# Patient Record
Sex: FEMALE | Race: WHITE | Employment: OTHER | URBAN - METROPOLITAN AREA
[De-identification: names, ages, dates, MRNs, and addresses within clinical notes are randomized per-mention and may not be internally consistent; named-entity substitution may affect disease eponyms.]

---

## 2017-01-01 ENCOUNTER — TELEPHONE (OUTPATIENT)
Dept: NEUROLOGY | Facility: CLINIC | Age: 82
End: 2017-01-01

## 2017-01-01 ENCOUNTER — NURSE ONLY (OUTPATIENT)
Dept: ELECTROPHYSIOLOGY | Facility: HOSPITAL | Age: 82
End: 2017-01-01
Attending: Other
Payer: MEDICARE

## 2017-01-01 ENCOUNTER — APPOINTMENT (OUTPATIENT)
Dept: LAB | Facility: HOSPITAL | Age: 82
End: 2017-01-01
Attending: Other
Payer: MEDICARE

## 2017-01-01 ENCOUNTER — HOSPITAL ENCOUNTER (OUTPATIENT)
Dept: CT IMAGING | Facility: HOSPITAL | Age: 82
Discharge: HOME | End: 2017-01-01
Attending: Other
Payer: MEDICARE

## 2017-01-01 DIAGNOSIS — G30.1 LATE ONSET ALZHEIMER'S DISEASE WITHOUT BEHAVIORAL DISTURBANCE (HCC): ICD-10-CM

## 2017-01-01 DIAGNOSIS — F02.80 LATE ONSET ALZHEIMER'S DISEASE WITHOUT BEHAVIORAL DISTURBANCE (HCC): ICD-10-CM

## 2017-01-01 DIAGNOSIS — R41.0 CONFUSION: ICD-10-CM

## 2017-01-01 DIAGNOSIS — N18.30 CRF (CHRONIC RENAL FAILURE), STAGE 3 (MODERATE) (HCC): ICD-10-CM

## 2017-01-01 DIAGNOSIS — I10 HTN (HYPERTENSION), BENIGN: ICD-10-CM

## 2017-01-01 PROCEDURE — 36415 COLL VENOUS BLD VENIPUNCTURE: CPT

## 2017-01-01 PROCEDURE — 82746 ASSAY OF FOLIC ACID SERUM: CPT

## 2017-01-01 PROCEDURE — 95819 EEG AWAKE AND ASLEEP: CPT | Performed by: OTHER

## 2017-01-01 PROCEDURE — 84443 ASSAY THYROID STIM HORMONE: CPT

## 2017-01-01 PROCEDURE — 70450 CT HEAD/BRAIN W/O DYE: CPT | Performed by: OTHER

## 2017-01-01 PROCEDURE — 82607 VITAMIN B-12: CPT

## 2017-01-01 PROCEDURE — 87086 URINE CULTURE/COLONY COUNT: CPT | Performed by: INTERNAL MEDICINE

## 2017-01-24 ENCOUNTER — PRIOR ORIGINAL RECORDS (OUTPATIENT)
Dept: OTHER | Age: 82
End: 2017-01-24

## 2017-02-08 ENCOUNTER — PRIOR ORIGINAL RECORDS (OUTPATIENT)
Dept: OTHER | Age: 82
End: 2017-02-08

## 2017-02-08 LAB
ALBUMIN: 3.7 G/DL
ALKALINE PHOSPHATATE(ALK PHOS): 68 IU/L
BILIRUBIN TOTAL: 0.68 MG/DL
BUN: 30 MG/DL
CALCIUM: 8.9 MG/DL
CHLORIDE: 104 MEQ/L
CHOLESTEROL, TOTAL: 213 MG/DL
CREATININE, SERUM: 1.35 MG/DL
GLUCOSE: 101 MG/DL
HDL CHOLESTEROL: 93 MG/DL
HEMATOCRIT: 37.1 %
HEMOGLOBIN: 11.8 G/DL
LDL CHOLESTEROL: 105 MG/DL
PLATELETS: 207 K/UL
POTASSIUM, SERUM: 5 MEQ/L
PROTEIN, TOTAL: 7.2 G/DL
RED BLOOD COUNT: 4.1 X 10-6/U
SGOT (AST): 18 IU/L
SGPT (ALT): 20 IU/L
SODIUM: 144 MEQ/L
TRIGLYCERIDES: 74 MG/DL
WHITE BLOOD COUNT: 6.88 X 10-3/U

## 2017-04-03 ENCOUNTER — PRIOR ORIGINAL RECORDS (OUTPATIENT)
Dept: OTHER | Age: 82
End: 2017-04-03

## 2017-07-29 ENCOUNTER — PRIOR ORIGINAL RECORDS (OUTPATIENT)
Dept: OTHER | Age: 82
End: 2017-07-29

## 2017-08-01 ENCOUNTER — PRIOR ORIGINAL RECORDS (OUTPATIENT)
Dept: OTHER | Age: 82
End: 2017-08-01

## 2017-08-15 PROBLEM — Z85.3 HISTORY OF BREAST CANCER: Status: ACTIVE | Noted: 2017-08-15

## 2017-09-11 ENCOUNTER — PRIOR ORIGINAL RECORDS (OUTPATIENT)
Dept: OTHER | Age: 82
End: 2017-09-11

## 2017-09-19 LAB
BUN: 37 MG/DL
CALCIUM: 9.1 MG/DL
CHLORIDE: 102 MEQ/L
CREATININE, SERUM: 1.6 MG/DL
GLUCOSE: 94 MG/DL
HEMATOCRIT: 35.5 %
HEMOGLOBIN: 11.6 G/DL
PLATELETS: 199 K/UL
POTASSIUM, SERUM: 4.8 MEQ/L
RED BLOOD COUNT: 3.91 X 10-6/U
SODIUM: 140 MEQ/L
WHITE BLOOD COUNT: 8.05 X 10-3/U

## 2017-10-04 ENCOUNTER — PRIOR ORIGINAL RECORDS (OUTPATIENT)
Dept: OTHER | Age: 82
End: 2017-10-04

## 2017-10-24 ENCOUNTER — PRIOR ORIGINAL RECORDS (OUTPATIENT)
Dept: OTHER | Age: 82
End: 2017-10-24

## 2017-11-01 ENCOUNTER — PRIOR ORIGINAL RECORDS (OUTPATIENT)
Dept: OTHER | Age: 82
End: 2017-11-01

## 2017-11-02 ENCOUNTER — PRIOR ORIGINAL RECORDS (OUTPATIENT)
Dept: OTHER | Age: 82
End: 2017-11-02

## 2017-11-13 ENCOUNTER — TELEPHONE (OUTPATIENT)
Dept: NEUROLOGY | Facility: CLINIC | Age: 82
End: 2017-11-13

## 2017-11-13 NOTE — TELEPHONE ENCOUNTER
Pts PCP Dr. Pop Parson calling the office to see if pt can be seen by Dr. Chilo Rivers earlier than already scheduled upcoming OV on 12/7/17. She states that patient was recently in ED for increased confusion.   Testing ruled out UTI, so Dr. Pop Parson is suspecting pt i

## 2017-11-20 ENCOUNTER — OFFICE VISIT (OUTPATIENT)
Dept: NEUROLOGY | Facility: CLINIC | Age: 82
End: 2017-11-20

## 2017-11-20 VITALS
SYSTOLIC BLOOD PRESSURE: 138 MMHG | HEART RATE: 64 BPM | BODY MASS INDEX: 28 KG/M2 | RESPIRATION RATE: 16 BRPM | WEIGHT: 152 LBS | DIASTOLIC BLOOD PRESSURE: 74 MMHG

## 2017-11-20 DIAGNOSIS — G30.1 LATE ONSET ALZHEIMER'S DISEASE WITHOUT BEHAVIORAL DISTURBANCE (HCC): ICD-10-CM

## 2017-11-20 DIAGNOSIS — F02.80 LATE ONSET ALZHEIMER'S DISEASE WITHOUT BEHAVIORAL DISTURBANCE (HCC): ICD-10-CM

## 2017-11-20 DIAGNOSIS — I10 ESSENTIAL HYPERTENSION: Primary | ICD-10-CM

## 2017-11-20 DIAGNOSIS — R41.0 INTERMITTENT CONFUSION: ICD-10-CM

## 2017-11-20 DIAGNOSIS — R41.0 CONFUSION: ICD-10-CM

## 2017-11-20 PROCEDURE — 99215 OFFICE O/P EST HI 40 MIN: CPT | Performed by: OTHER

## 2017-11-20 RX ORDER — DONEPEZIL HYDROCHLORIDE 10 MG/1
10 TABLET, FILM COATED ORAL NIGHTLY
Qty: 30 TABLET | Refills: 11 | Status: SHIPPED | OUTPATIENT
Start: 2017-11-20 | End: 2017-11-20

## 2017-11-20 RX ORDER — DONEPEZIL HYDROCHLORIDE 10 MG/1
10 TABLET, FILM COATED ORAL NIGHTLY
Qty: 30 TABLET | Refills: 11 | Status: SHIPPED | OUTPATIENT
Start: 2017-11-20

## 2017-11-20 RX ORDER — GUAIFENESIN 100 MG/5ML
200 SYRUP ORAL EVERY 6 HOURS PRN
COMMUNITY
End: 2018-01-01 | Stop reason: ALTCHOICE

## 2017-11-20 RX ORDER — NITROFURANTOIN 25; 75 MG/1; MG/1
50 CAPSULE ORAL DAILY
COMMUNITY
End: 2018-01-01 | Stop reason: ALTCHOICE

## 2017-11-20 NOTE — PATIENT INSTRUCTIONS
Refill policies:    • Allow 2-3 business days for refills; controlled substances may take longer.   • Contact your pharmacy at least 5 days prior to running out of medication and have them send an electronic request or submit request through the [facility] have a procedure or additional testing performed. [facility]) will contact your insurance carrier to obtain pre-certification or prior authorization.     Unfortunately, [facility] has seen an increase in denial of payment even though the p

## 2017-11-20 NOTE — PROGRESS NOTES
Nichol [address]   Neurology; follow up  CLINIC VISIT  2017    [place] Patient Status:  No patient class for patient encounter    1927 MRN [id number]   Location [address] [place] [address] guaiFENesin 100 MG/5ML Oral Syrup Take 200 mg by mouth every 6 (six) hours as needed for cough. Disp:  Rfl:    Donepezil HCl 10 MG Oral Tab Take 1 tablet (10 mg total) by mouth nightly.  Disp: 30 tablet Rfl: 11   furosemide 20 MG Oral Tab Take 1 tablet (2 denies nausea, vomiting, constipation, diarrhea; no rectal bleeding; no heartburn  GENITAL/: no dysuria, urgency or frequency;  no hernias; no nocturia  GYNE/: no dysuria, urgency or frequency; no urinary incontinence  MUSCULOSKELETAL: no joint complai symmetric. 5/5 in all limbs with normal tone. No tremor of any kind; there is limited motion in her L. Shoulder due to recent surgery,   Sensory; Intact to light touch, temperature, vibration   DTRs;   Symmetric in both arms and legs, including biceps, tri [address]   11/20/2017

## 2017-11-21 NOTE — TELEPHONE ENCOUNTER
Lynnette Stapleton, nurse at [employer], calling to confirm the increase in Aricept to 10 mg at . Relayed doctors recommendations based on office visit notes. Verbalized understanding. Office visit notes faxed to number provided. Confirmation received.     Per D

## 2017-11-27 NOTE — TELEPHONE ENCOUNTER
Left message for pts daughter Ankush Kaba to see if she still required a conversation with Dr. Fred Aldrich. Encouraged her to c/b office tomorrow, as Dr. Fred Aldrich is out of office today, if she still needs to speak with her.

## 2017-12-01 NOTE — TELEPHONE ENCOUNTER
----- Message from Nick Maria MD sent at 11/29/2017  5:09 PM CST -----  CT head , nothing  Significant, will review at visit.  Labs normal.

## 2017-12-01 NOTE — TELEPHONE ENCOUNTER
Spoke with Ankush Kaba, patient's daughter and POA and relayed results below. She verbalized understanding.

## 2017-12-01 NOTE — TELEPHONE ENCOUNTER
# listed in [id number] [address] that can leave message is for Sho Shelton who is not listed on HIPPA consent as someone we are able to give info to.  TCB daughter Ella Fierro who is listed on HIPPA consent

## 2017-12-07 NOTE — PROCEDURES
[facility] [address]  [facility] [address] [address]      PATIENT'S NAME: Marlin Lino   ATTENDING PHYSICIAN: Jacob Andrade M.D.    PATIENT ACCOUNT #: [de-identified] LOCATION: [facility]   MEDICAL RECORD #: [id number] DATE OF BIRTH: [birth year]

## 2017-12-08 ENCOUNTER — PRIOR ORIGINAL RECORDS (OUTPATIENT)
Dept: OTHER | Age: 82
End: 2017-12-08

## 2017-12-08 NOTE — TELEPHONE ENCOUNTER
Spoke with Kandy Pierre, patient's daughter (ok per HIPPA). Relayed EEG results. She verbalized understanding. Please only release sensitive information to [facility]. Kandy Pierre is available at [phone]. FYI updated .

## 2018-01-01 PROCEDURE — 87086 URINE CULTURE/COLONY COUNT: CPT | Performed by: INTERNAL MEDICINE

## 2018-01-24 PROBLEM — I47.1 SVT (SUPRAVENTRICULAR TACHYCARDIA) (HCC): Status: ACTIVE | Noted: 2018-01-01

## 2018-01-24 PROBLEM — I70.0 ATHEROSCLEROSIS OF AORTA (HCC): Status: ACTIVE | Noted: 2018-01-01

## 2018-01-24 PROBLEM — I77.1 TORTUOUS AORTA (HCC): Status: ACTIVE | Noted: 2018-01-01

## 2018-01-24 PROBLEM — I77.9 CAROTID DISEASE, BILATERAL (HCC): Status: ACTIVE | Noted: 2018-01-01

## 2018-02-02 PROBLEM — I70.0 ATHEROSCLEROSIS OF AORTA (HCC): Status: RESOLVED | Noted: 2018-01-01 | Resolved: 2018-01-01

## 2018-02-02 PROBLEM — I77.9 CAROTID DISEASE, BILATERAL (HCC): Status: RESOLVED | Noted: 2018-01-01 | Resolved: 2018-01-01

## 2018-02-02 PROBLEM — I77.1 TORTUOUS AORTA (HCC): Status: RESOLVED | Noted: 2018-01-01 | Resolved: 2018-01-01

## 2018-02-02 PROBLEM — R41.0 INTERMITTENT CONFUSION: Status: RESOLVED | Noted: 2017-11-20 | Resolved: 2018-01-01

## 2018-02-02 PROBLEM — I47.1 SVT (SUPRAVENTRICULAR TACHYCARDIA) (HCC): Status: RESOLVED | Noted: 2018-01-01 | Resolved: 2018-01-01

## 2018-07-02 ENCOUNTER — MYAURORA ACCOUNT LINK (OUTPATIENT)
Dept: OTHER | Age: 83
End: 2018-07-02

## 2018-07-02 ENCOUNTER — PRIOR ORIGINAL RECORDS (OUTPATIENT)
Dept: OTHER | Age: 83
End: 2018-07-02

## 2018-07-03 ENCOUNTER — PRIOR ORIGINAL RECORDS (OUTPATIENT)
Dept: OTHER | Age: 83
End: 2018-07-03

## 2018-07-03 LAB
ALBUMIN: 3.1 G/DL
ALKALINE PHOSPHATATE(ALK PHOS): 56 IU/L
BILIRUBIN TOTAL: 0.4 MG/DL
BUN: 14 MG/DL
CALCIUM: 9.6 MG/DL
CHLORIDE: 101 MEQ/L
CHOLESTEROL, TOTAL: 168 MG/DL
CREATININE, SERUM: 0.9 MG/DL
GLUCOSE: 108 MG/DL
HDL CHOLESTEROL: 60 MG/DL
HEMATOCRIT: 36 %
HEMOGLOBIN: 11.4 G/DL
LDL CHOLESTEROL: 86 MG/DL
PLATELETS: 296 K/UL
POTASSIUM, SERUM: 4.1 MEQ/L
PROTEIN, TOTAL: 7.1 G/DL
RED BLOOD COUNT: 4.09 X 10-6/U
SGOT (AST): 23 IU/L
SGPT (ALT): 14 IU/L
SODIUM: 142 MEQ/L
TRIGLYCERIDES: 111 MG/DL
WHITE BLOOD COUNT: 7.74 X 10-3/U

## 2018-07-23 PROBLEM — I27.20 PULMONARY HYPERTENSION (HCC): Status: ACTIVE | Noted: 2018-01-01

## 2018-07-23 PROBLEM — N18.4 STAGE 4 CHRONIC KIDNEY DISEASE (HCC): Status: ACTIVE | Noted: 2018-01-01

## 2019-02-28 VITALS
HEIGHT: 63 IN | WEIGHT: 155 LBS | BODY MASS INDEX: 27.46 KG/M2 | SYSTOLIC BLOOD PRESSURE: 128 MMHG | DIASTOLIC BLOOD PRESSURE: 64 MMHG | HEART RATE: 70 BPM

## 2019-02-28 VITALS
HEIGHT: 63 IN | BODY MASS INDEX: 25.52 KG/M2 | WEIGHT: 144 LBS | HEART RATE: 72 BPM | DIASTOLIC BLOOD PRESSURE: 60 MMHG | SYSTOLIC BLOOD PRESSURE: 128 MMHG

## 2019-03-01 VITALS
WEIGHT: 161 LBS | HEIGHT: 62 IN | BODY MASS INDEX: 29.63 KG/M2 | SYSTOLIC BLOOD PRESSURE: 130 MMHG | DIASTOLIC BLOOD PRESSURE: 78 MMHG | HEART RATE: 68 BPM

## (undated) NOTE — MR AVS SNAPSHOT
After Visit Summary   12/5/2017    Casper Black    MRN: [id number]           Visit Information     Date & Time  12/5/2017 10:30 AM Provider  [address] [facility] Dept.  Phone  [phone]      Allergies as of 12/5/2017  Reviewe Late onset Alzheimer's disease without behavioral disturbance   [[id number]]             We Ordered the Following     Normal Orders This Visit    EEG [NEU4 CUSTOM]       Future Appointments        Provider Department    12/15/2017 10:20 AM [address] [facility] [address]  Monday - Friday  10:00 am - 10:00 pm  Saturday - Sunday  10:00 am - 4:00 pm      [address]  Monday - Friday  4:00 pm - 10:00 pm  Saturday - Sunday  10:00 am - 4:00 pm       Conditions needing urge